# Patient Record
Sex: FEMALE | Race: BLACK OR AFRICAN AMERICAN | NOT HISPANIC OR LATINO | Employment: FULL TIME | URBAN - METROPOLITAN AREA
[De-identification: names, ages, dates, MRNs, and addresses within clinical notes are randomized per-mention and may not be internally consistent; named-entity substitution may affect disease eponyms.]

---

## 2024-04-22 ENCOUNTER — HOSPITAL ENCOUNTER (OUTPATIENT)
Dept: RADIOLOGY | Facility: HOSPITAL | Age: 64
Discharge: HOME | End: 2024-04-22
Payer: COMMERCIAL

## 2024-04-22 ENCOUNTER — OFFICE VISIT (OUTPATIENT)
Dept: ORTHOPEDIC SURGERY | Facility: HOSPITAL | Age: 64
End: 2024-04-22
Payer: COMMERCIAL

## 2024-04-22 DIAGNOSIS — M25.562 ACUTE PAIN OF LEFT KNEE: ICD-10-CM

## 2024-04-22 DIAGNOSIS — S83.262A: Primary | ICD-10-CM

## 2024-04-22 PROCEDURE — 99213 OFFICE O/P EST LOW 20 MIN: CPT | Performed by: FAMILY MEDICINE

## 2024-04-22 PROCEDURE — 73564 X-RAY EXAM KNEE 4 OR MORE: CPT | Mod: LEFT SIDE | Performed by: RADIOLOGY

## 2024-04-22 PROCEDURE — 99203 OFFICE O/P NEW LOW 30 MIN: CPT | Performed by: FAMILY MEDICINE

## 2024-04-22 PROCEDURE — 73564 X-RAY EXAM KNEE 4 OR MORE: CPT | Mod: LT

## 2024-04-22 NOTE — PROGRESS NOTES
** Please excuse any errors in grammar or translation related to this dictation. Voice recognition software was utilized to prepare this document. **    Assessment & Plan:  Discussed findings of today's x-rays as well as previous MRI of her left knee.  While she does have  tricompartmental cartilage loss, there is also a peripheral tear through her lateral meniscus.  Discussed treatment options to address her condition to include continued physical therapy, bracing, steroid injection and a referral to surgeon.  As patient's biggest complaint is knee locking causing her to feel unstable, do feel patient would benefit speaking with surgeon about potential meniscal surgery.  I did explain to patient that while potential surgery addressing the mensicus would alleviate the locking, it would unlikely improve her pain symptoms from underlying OA.  If after speaking with surgeon she is found not to be a surgical candidate or does not want to pursue surgery, I am happy to see the patient back for nonoperative management.  All questions answered and she agrees to this plan of care.      Chief complaint:  Left knee pain, [facility]    HPI:  64 y/o F presents with left knee pain.  This complaint has been ongoing since 4/2023.  Symptoms started after tripping on a step and falling forward onto knees at work. She works for [employer] as a [employer] .  Symptoms have progressively worsened with time.  Pain is most prominent at anterior and medial knee.  Reports several times weekly, her left knee will lock and make her feel unstable..  Symptoms are aggravated by stairs. To date, patient has tried a variety of treatments to include physical therapy, tylenol with little sustained effect.  No previous injections or surgery on this knee. Had MRI completed on this knee in 10/2023.    There is no problem list on file for this patient.    No past surgical history on file.  No current outpatient medications on file prior to visit.      No current facility-administered medications on file prior to visit.     Exam:  LEFT Knee Exam:  [Normal gait]  No effusion, ecchymosis, erythema  TTP at medial joint line  Non-TTP at lateral joint line or with patellar compression  Flexion to [130] [id number], Extension to [0] of 0deg   5/5 strength of knee flexion and extension  SILT   ACL: [-]Lachman, [-]Anterior drawer  PCL:  [-]Posterior drawer  MCL: No laxity or pain with valgus stress at 0 or 30 deg  LCL: No laxity or pain with varus stress at 0 or 30 deg  MENISCAL SIGNS: [-]Leesa´s  PATELLA: [-]Grind, [-]Compression  FAT PAD:  [-]Hoffa´s     Results:  Xrays of left knee  obtained 4/22/24 demonstrate tricompartmental degenerative changes.    MRI of left knee obtained 10/2023 reviewed demonstrating tricompartmental cartilage thinning with lateral meniscal tear.

## 2024-05-14 ENCOUNTER — OFFICE VISIT (OUTPATIENT)
Dept: ORTHOPEDIC SURGERY | Facility: CLINIC | Age: 64
End: 2024-05-14
Payer: COMMERCIAL

## 2024-05-14 DIAGNOSIS — M17.12 PRIMARY OSTEOARTHRITIS OF LEFT KNEE: Primary | ICD-10-CM

## 2024-05-14 DIAGNOSIS — S83.282A ACUTE LATERAL MENISCUS TEAR OF LEFT KNEE, INITIAL ENCOUNTER: ICD-10-CM

## 2024-05-14 PROCEDURE — 99203 OFFICE O/P NEW LOW 30 MIN: CPT | Performed by: STUDENT IN AN ORGANIZED HEALTH CARE EDUCATION/TRAINING PROGRAM

## 2024-05-14 NOTE — LETTER
May 14, 2024     Manuelito Victoria DO  [address]  [address]  [facility] [address]    Patient: Tasia Moreland   YOB: 1960   Date of Visit: 5/14/2024       Dear Dr. Manuelito Victoria, :    Thank you for referring Tasia Moreland to me for evaluation. Below are my notes for this consultation.  If you have questions, please do not hesitate to call me. I look forward to following your patient along with you.       Sincerely,     Harpreet Abreu MD      CC: No Recipients  ______________________________________________________________________________________    Tasia Moreland  is a 63 y.o. year-old  female. she  is a new patient to our office and presents with a chief complaint of Left  knee pain.  She was referred by Dr. Victoria.  She had a work-related injury when she fell on 4/10/2023.  Has had pain since then.  Has not had formal treatment.  No injections no therapy.  She has an MRI and x-rays for review.  Reports the knee locks up and gives out on her.      Past Medical, Family, and Social History reviewed   Review of Systems  A complete review of systems was conducted, pertinent only to the HPI noted above.  Constitutional: None  Eyes: No additions to above history  Ears, Nose, Throat: No additions to above history  Cardiovascular: No additions to above history  Respiratory: No additions to above history  GI: No additions to above history  : No additions to above history  Skin/Neuro: No additions to above history  Endocrine/Heme/Lymph: No additions to above history  Immunologic: No additions to above history  Psychiatric: No additions to above history  Musculoskeletal: see above    GEN: Alert and Oriented x 3  Constitutional: Well appearing , in no apparent distress.  Skin: No rashes, erythema, or induration around knee    MUSCULOSKELETAL EXAM:     Left KNEE:  ROM: 0/0/125  Effusion: positive  Alignment: [neutral]      Gait: [normal]  Sensation intact bilaterally  sural/saphenous/sp/dp/tibial nerve bilaterally  Motor 5/5 knee flexion/extension/foot DF/PF/EHL/FHL bilaterally  Palpable/symmetric DP and PT pulse bilaterally      PATELLAR/EXTENSOR MECHANISM:   KNEE:  Patellar Crepitus: n  Patellar Compression Pain:n  Patellar Apprehension: [no]  Extensor Mechanism: [intact]  Straight Leg Raise: good      LIGAMENTS:  ACL: Lachmans: [negative]  PCL: [stable]  Valgus at 0 degrees: [stable]  Valgus at 30 degrees: [stable]  Varus at 0 degrees: [stable]  Varus at 30 degrees: [stable]    MENISCUS EXAM:  Joint Line Tenderness:medial joint line tenderness  McMurrays: [negative]  Pain with Deep Flexion: [No]    Xrays and MRI independently viewed and interpreted: Moderate degenerative changes of the knee and a horizontal nondisplaced tear of the lateral meniscus    I reviewed with the patient the diagnosis of knee osteoarthritis and a degenerative meniscus tear.  Given the amount of degenerative changes I would not recommend arthroscopy.  We discussed conservative treatment in the form of physical therapy consideration for corticosteroid injections although she does not want to consider injection at this time.  All questions were answered she is in agreement with this plan.

## 2024-05-14 NOTE — PROGRESS NOTES
Tasia Moreland  is a 63 y.o. year-old  female. she  is a new patient to our office and presents with a chief complaint of Left  knee pain.  She was referred by Dr. Victoria.  She had a work-related injury when she fell on 4/10/2023.  Has had pain since then.  Has not had formal treatment.  No injections no therapy.  She has an MRI and x-rays for review.  Reports the knee locks up and gives out on her.      Past Medical, Family, and Social History reviewed   Review of Systems  A complete review of systems was conducted, pertinent only to the HPI noted above.  Constitutional: None  Eyes: No additions to above history  Ears, Nose, Throat: No additions to above history  Cardiovascular: No additions to above history  Respiratory: No additions to above history  GI: No additions to above history  : No additions to above history  Skin/Neuro: No additions to above history  Endocrine/Heme/Lymph: No additions to above history  Immunologic: No additions to above history  Psychiatric: No additions to above history  Musculoskeletal: see above    GEN: Alert and Oriented x 3  Constitutional: Well appearing , in no apparent distress.  Skin: No rashes, erythema, or induration around knee    MUSCULOSKELETAL EXAM:     Left KNEE:  ROM: 0/0/125  Effusion: positive  Alignment: [neutral]      Gait: [normal]  Sensation intact bilaterally sural/saphenous/sp/dp/tibial nerve bilaterally  Motor 5/5 knee flexion/extension/foot DF/PF/EHL/FHL bilaterally  Palpable/symmetric DP and PT pulse bilaterally      PATELLAR/EXTENSOR MECHANISM:   KNEE:  Patellar Crepitus: n  Patellar Compression Pain:n  Patellar Apprehension: [no]  Extensor Mechanism: [intact]  Straight Leg Raise: good      LIGAMENTS:  ACL: Lachmans: [negative]  PCL: [stable]  Valgus at 0 degrees: [stable]  Valgus at 30 degrees: [stable]  Varus at 0 degrees: [stable]  Varus at 30 degrees: [stable]    MENISCUS EXAM:  Joint Line Tenderness:medial joint line tenderness  McMurrays:  [negative]  Pain with Deep Flexion: [No]    Xrays and MRI independently viewed and interpreted: Moderate degenerative changes of the knee and a horizontal nondisplaced tear of the lateral meniscus    I reviewed with the patient the diagnosis of knee osteoarthritis and a degenerative meniscus tear.  Given the amount of degenerative changes I would not recommend arthroscopy.  We discussed conservative treatment in the form of physical therapy consideration for corticosteroid injections although she does not want to consider injection at this time.  All questions were answered she is in agreement with this plan.